# Patient Record
Sex: FEMALE | Race: WHITE
[De-identification: names, ages, dates, MRNs, and addresses within clinical notes are randomized per-mention and may not be internally consistent; named-entity substitution may affect disease eponyms.]

---

## 2019-08-07 ENCOUNTER — HOSPITAL ENCOUNTER (OUTPATIENT)
Dept: HOSPITAL 84 - D.OPS | Age: 34
Discharge: HOME | End: 2019-08-07
Attending: INTERNAL MEDICINE
Payer: MEDICAID

## 2019-08-07 VITALS — BODY MASS INDEX: 47.21 KG/M2 | HEIGHT: 62 IN | WEIGHT: 256.54 LBS | BODY MASS INDEX: 47.21 KG/M2

## 2019-08-07 VITALS — DIASTOLIC BLOOD PRESSURE: 85 MMHG | SYSTOLIC BLOOD PRESSURE: 135 MMHG

## 2019-08-07 DIAGNOSIS — R12: ICD-10-CM

## 2019-08-07 DIAGNOSIS — K21.0: Primary | ICD-10-CM

## 2019-08-07 DIAGNOSIS — Z01.812: ICD-10-CM

## 2019-08-07 LAB
ERYTHROCYTE [DISTWIDTH] IN BLOOD BY AUTOMATED COUNT: 13.2 % (ref 11.5–14.5)
HCT VFR BLD CALC: 37.9 % (ref 36–48)
HGB BLD-MCNC: 12.9 G/DL (ref 12–16)
MCH RBC QN AUTO: 28 PG (ref 26–34)
MCHC RBC AUTO-ENTMCNC: 34 G/DL (ref 31–37)
MCV RBC: 82.4 FL (ref 80–100)
PLATELET # BLD: 279 10X3/UL (ref 130–400)
PMV BLD AUTO: 10.7 FL (ref 7.4–10.4)
RBC # BLD AUTO: 4.6 10X6/UL (ref 4–5.4)
WBC # BLD AUTO: 8.4 10X3/UL (ref 4.8–10.8)

## 2019-08-08 NOTE — OP
PATIENT NAME:  TANK BENITES                             MEDICAL RECORD: [id number]
:89                                             LOCATION:D.OPS          
                                                         ADMISSION DATE:        
SURGEON:  JACQUELINE CASTILLO DO             
 
 
DATE OF OPERATION:  2019
 
PROCEDURE:  EGD with biopsies.
 
INDICATIONS FOR PROCEDURE:  Heartburn, epigastric abdominal pain, nausea and
vomiting.
 
SCOPE:  Olympus video gastroscope.
 
MEDICATIONS:  Propofol 200 mg IV per anesthesia.
 
ESTIMATED BLOOD LOSS:  Minimal.
 
COMPLICATIONS:  None.
 
FINDINGS AND DESCRIPTION OF PROCEDURE:  Informed consent was given.  The patient
was made comfortable with the above medication.  After reaching an adequate
level of sedation by slow IV push, the patient was placed in the left side.  The
endoscope was advanced under direct visualization through the mouth to the third
portion of the duodenum.  The entire esophagus appeared normal.  At the GE
junction, there were reflux changes consistent with LA class B reflux-induced
esophagitis.  Two cold forceps biopsies were taken at the Z-line to rule out the
presence of Gaffney's esophagus.  The GE junction had a slightly patulous
appearance.  The endoscope was advanced through the GE junction into the stomach
and retroflexed to view the cardia and fundus, which appeared normal. 
Throughout the stomach body, antrum, and prepyloric regions, there were patchy
areas of erythema and granularity consistent with possible gastritis.  Random
cold forceps biopsies were taken to submit for histopathology and to rule out
the presence of H. pylori.  The endoscope was advanced beyond the pylorus into
the duodenum, which appeared normal down to the third portion.  The endoscope
was slowly withdrawn from the patient.  The patient tolerated the procedure well
and there were no complications.
 
IMPRESSION:
1.  LA class B reflux-induced esophagitis.
2.  Patulous GE junction, which may be playing a role in the patient's severe
reflux and heartburn.
3.  Possible gastritis.
 
PLAN AND RECOMMENDATIONS:
1.  Discharge home when recovery parameters are met.
2.  Follow up biopsy specimen results.
3.  GERD diet and reflux precautions.
4.  Hold ranitidine and start omeprazole 40 mg capsules daily.
5.  Follow up in GI clinic in 3-4 weeks.
6.  If the patient is improved with this medication, we will need to try to
develop a strategy to reduce her dosing after 8 weeks to a trial of 20 mg
capsules daily or potentially switching back to a histamine blocker, which will
likely be ineffective as it is now.  If she is no better, may consider trying to
add Carafate or an H2 blocker in the evening on top of her 40 mg dose in the
morning.
7.  If we are unable to control her heartburn and reflux with medications,
 
 
 
OPERATIVE REPORT                               [id number]    TANK BENITES           
 
 
consider referral to surgery for antireflux procedure.
 
TRANSINT:[id number] Voice Confirmation ID: [id number] DOCUMENT ID: [id number]
                                           
                                           JACQUELINE CASTILLO DO             
 
 
 
Electronically Signed by JACQUELINE ALVA on 19 at 1725
 
 
 
 
 
 
 
 
 
 
 
 
 
 
 
 
 
 
 
 
 
 
 
 
 
 
 
 
 
 
 
 
 
 
 
 
 
 
CC:                                                             [id number]
DICTATION DATE: 19     :     19 1027      [facility] 
                                                                      19
[facility]                                          
[address] [place] [address]